# Patient Record
Sex: FEMALE | Race: WHITE
[De-identification: names, ages, dates, MRNs, and addresses within clinical notes are randomized per-mention and may not be internally consistent; named-entity substitution may affect disease eponyms.]

---

## 2021-07-12 ENCOUNTER — HOSPITAL ENCOUNTER (OUTPATIENT)
Dept: HOSPITAL 43 - DL.ENDO | Age: 46
Discharge: HOME | End: 2021-07-12
Attending: INTERNAL MEDICINE
Payer: COMMERCIAL

## 2021-07-12 DIAGNOSIS — K52.9: Primary | ICD-10-CM

## 2021-07-12 DIAGNOSIS — Z98.890: ICD-10-CM

## 2021-07-12 DIAGNOSIS — Z86.16: ICD-10-CM

## 2021-07-12 PROCEDURE — 45380 COLONOSCOPY AND BIOPSY: CPT

## 2021-07-12 NOTE — OR
DATE:  07/12/2021

 

PROCEDURES:  Total colonoscopy, total ileoscopy, narrow band imaging, and

multiple pinch biopsies.

 

INSTRUMENT USED:  PCF-H190DL Olympus video colonoscope.

 

PREMEDICATIONS:  Fentanyl 200 mcg intravenous, Versed 4 mg intravenous.

 

The procedure was done under pulse oximetry, BP recording, and cardiac monitor.

 

INDICATION:  The patient with status post COVID-19 and chronic diarrhea and

abdominal pain, unexplained but not responsive to medical measures.

Colonoscopic examination is done for detection of any polypoid lesions and

removal, biopsies to be obtained for microscopic colitis, endoscopic hemostasis

therapy if needed.

 

DESCRIPTION OF PROCEDURE:  Initial rectal exam was unremarkable.  Rigid anoscopy

showed patchy erythema of the rectal mucosa with some contact bleeding.  The

colonoscope was passed with ease.  Photographs were taken of the rectum,

descending colon, transverse colon, as well as cecum and ascending colon showing

areas of friability, contact bleeding, and ulcerations.  No bleeding was noted

from any of the visualized areas at the commencement of the examination.  No

stricture.  No polyp or tumor mass identified.  The scope was passed beyond the

ileocecal junction to visualize terminal ileum that showed evidence of ileitis

with ulcers.  NBI views were taken.  Photographs were obtained.  Multiple pinch

biopsies were obtained from the normal mucosa of the terminal ileum, cecum,

ascending colon, transverse colon, descending colon, as well as rectosigmoid and

sent for histopathology.  The bowel preparation was found to be adequate, Thomson

scale 3 in all the areas.  Total score 9.  Probing the proximal sides of folds

and flexures using adequate distention and clearing of the stool material,

withdrawal of the scope was made.  No bleeding was noted from any of the

visualized areas at the completion of examination.

 

IMPRESSION:  Inflammatory bowel disease.

 

The patient tolerated the procedure well.

 

DD:  07/12/2021 08:10:09

DT:  07/12/2021 08:48:16

[facility]

Job #:  [id number]

## 2022-03-08 ENCOUNTER — HOSPITAL ENCOUNTER (OUTPATIENT)
Dept: HOSPITAL 43 - DL.SDS | Age: 47
Discharge: HOME | End: 2022-03-08
Attending: INTERNAL MEDICINE
Payer: COMMERCIAL

## 2022-03-08 DIAGNOSIS — E66.09: ICD-10-CM

## 2022-03-08 DIAGNOSIS — D64.9: ICD-10-CM

## 2022-03-08 DIAGNOSIS — K51.911: Primary | ICD-10-CM

## 2022-03-08 DIAGNOSIS — Z86.16: ICD-10-CM

## 2022-06-04 ENCOUNTER — HOSPITAL ENCOUNTER (EMERGENCY)
Dept: HOSPITAL 43 - DL.ED | Age: 47
Discharge: HOME | End: 2022-06-04
Payer: COMMERCIAL

## 2022-06-04 DIAGNOSIS — E66.9: ICD-10-CM

## 2022-06-04 DIAGNOSIS — Z79.52: ICD-10-CM

## 2022-06-04 DIAGNOSIS — R94.6: ICD-10-CM

## 2022-06-04 DIAGNOSIS — K50.90: ICD-10-CM

## 2022-06-04 DIAGNOSIS — Z86.16: ICD-10-CM

## 2022-06-04 DIAGNOSIS — U07.1: Primary | ICD-10-CM

## 2022-06-04 LAB
ANION GAP SERPL CALC-SCNC: 13.7 MEQ/L (ref 7–13)
CHLORIDE SERPL-SCNC: 106 MMOL/L (ref 98–107)
SARS-COV-2 RNA RESP QL NAA+PROBE: POSITIVE
SODIUM SERPL-SCNC: 142 MMOL/L (ref 136–145)

## 2022-06-15 ENCOUNTER — HOSPITAL ENCOUNTER (OUTPATIENT)
Dept: HOSPITAL 43 - DL.ENDO | Age: 47
Discharge: HOME | End: 2022-06-15
Attending: INTERNAL MEDICINE
Payer: COMMERCIAL

## 2022-06-15 DIAGNOSIS — K52.9: Primary | ICD-10-CM

## 2022-06-15 DIAGNOSIS — E66.09: ICD-10-CM

## 2022-06-15 DIAGNOSIS — Z79.899: ICD-10-CM

## 2022-06-15 DIAGNOSIS — Z86.16: ICD-10-CM

## 2022-06-15 DIAGNOSIS — D64.9: ICD-10-CM

## 2022-06-15 DIAGNOSIS — K63.3: ICD-10-CM
